# Patient Record
Sex: FEMALE | Race: WHITE | Employment: OTHER | URBAN - METROPOLITAN AREA
[De-identification: names, ages, dates, MRNs, and addresses within clinical notes are randomized per-mention and may not be internally consistent; named-entity substitution may affect disease eponyms.]

---

## 2019-03-15 ENCOUNTER — TELEPHONE (OUTPATIENT)
Dept: ORTHOPEDIC SURGERY | Age: 44
End: 2019-03-15

## 2019-04-03 ENCOUNTER — APPOINTMENT (OUTPATIENT)
Dept: GENERAL RADIOLOGY | Age: 44
End: 2019-04-03
Payer: MEDICARE

## 2019-04-03 ENCOUNTER — HOSPITAL ENCOUNTER (EMERGENCY)
Age: 44
Discharge: HOME | End: 2019-04-03
Attending: EMERGENCY MEDICINE
Payer: MEDICARE

## 2019-04-03 VITALS
BODY MASS INDEX: 39.05 KG/M2 | TEMPERATURE: 98.4 F | OXYGEN SATURATION: 92 % | SYSTOLIC BLOOD PRESSURE: 120 MMHG | WEIGHT: 280 LBS | DIASTOLIC BLOOD PRESSURE: 66 MMHG | HEART RATE: 91 BPM | RESPIRATION RATE: 20 BRPM

## 2019-04-03 DIAGNOSIS — B34.9 VIRAL ILLNESS: Primary | ICD-10-CM

## 2019-04-03 PROCEDURE — 6360000002 HC RX W HCPCS: Performed by: STUDENT IN AN ORGANIZED HEALTH CARE EDUCATION/TRAINING PROGRAM

## 2019-04-03 PROCEDURE — 99283 EMERGENCY DEPT VISIT LOW MDM: CPT

## 2019-04-03 PROCEDURE — 6370000000 HC RX 637 (ALT 250 FOR IP): Performed by: STUDENT IN AN ORGANIZED HEALTH CARE EDUCATION/TRAINING PROGRAM

## 2019-04-03 PROCEDURE — 94640 AIRWAY INHALATION TREATMENT: CPT

## 2019-04-03 RX ORDER — ONDANSETRON 4 MG/1
4 TABLET, FILM COATED ORAL EVERY 8 HOURS PRN
Qty: 10 TABLET | Refills: 0 | Status: SHIPPED | OUTPATIENT
Start: 2019-04-03

## 2019-04-03 RX ORDER — ALBUTEROL SULFATE 90 UG/1
2 AEROSOL, METERED RESPIRATORY (INHALATION)
Status: DISCONTINUED | OUTPATIENT
Start: 2019-04-03 | End: 2019-04-03 | Stop reason: HOSPADM

## 2019-04-03 RX ORDER — ACETAMINOPHEN 325 MG/1
650 TABLET ORAL ONCE
Status: COMPLETED | OUTPATIENT
Start: 2019-04-03 | End: 2019-04-03

## 2019-04-03 RX ORDER — ALBUTEROL SULFATE 2.5 MG/3ML
5 SOLUTION RESPIRATORY (INHALATION)
Status: DISCONTINUED | OUTPATIENT
Start: 2019-04-03 | End: 2019-04-03

## 2019-04-03 RX ORDER — BENZONATATE 100 MG/1
100 CAPSULE ORAL 3 TIMES DAILY PRN
Qty: 30 CAPSULE | Refills: 0 | Status: SHIPPED | OUTPATIENT
Start: 2019-04-03 | End: 2019-04-10

## 2019-04-03 RX ORDER — IBUPROFEN 400 MG/1
400 TABLET ORAL ONCE
Status: COMPLETED | OUTPATIENT
Start: 2019-04-03 | End: 2019-04-03

## 2019-04-03 RX ORDER — ONDANSETRON 4 MG/1
4 TABLET, FILM COATED ORAL ONCE
Status: COMPLETED | OUTPATIENT
Start: 2019-04-03 | End: 2019-04-03

## 2019-04-03 RX ORDER — ALBUTEROL SULFATE 90 UG/1
2 AEROSOL, METERED RESPIRATORY (INHALATION)
Status: DISCONTINUED | OUTPATIENT
Start: 2019-04-03 | End: 2019-04-03

## 2019-04-03 RX ORDER — BENZONATATE 100 MG/1
100 CAPSULE ORAL ONCE
Status: COMPLETED | OUTPATIENT
Start: 2019-04-03 | End: 2019-04-03

## 2019-04-03 RX ADMIN — IBUPROFEN 400 MG: 400 TABLET ORAL at 18:11

## 2019-04-03 RX ADMIN — ONDANSETRON HYDROCHLORIDE 4 MG: 4 TABLET, FILM COATED ORAL at 18:11

## 2019-04-03 RX ADMIN — IPRATROPIUM BROMIDE 0.5 MG: 0.5 SOLUTION RESPIRATORY (INHALATION) at 18:16

## 2019-04-03 RX ADMIN — ACETAMINOPHEN 650 MG: 325 TABLET ORAL at 18:11

## 2019-04-03 RX ADMIN — BENZONATATE 100 MG: 100 CAPSULE ORAL at 18:11

## 2019-04-03 RX ADMIN — ALBUTEROL SULFATE 5 MG: 5 SOLUTION RESPIRATORY (INHALATION) at 18:16

## 2019-04-03 ASSESSMENT — ENCOUNTER SYMPTOMS
ABDOMINAL PAIN: 0
ABDOMINAL DISTENTION: 0
BACK PAIN: 0
COUGH: 1
SORE THROAT: 0
VOMITING: 0
WHEEZING: 0
NAUSEA: 1
SHORTNESS OF BREATH: 0
DIARRHEA: 1
TROUBLE SWALLOWING: 0

## 2019-04-03 ASSESSMENT — PAIN SCALES - GENERAL
PAINLEVEL_OUTOF10: 8
PAINLEVEL_OUTOF10: 8

## 2019-04-03 ASSESSMENT — PAIN DESCRIPTION - DESCRIPTORS: DESCRIPTORS: ACHING

## 2019-04-03 ASSESSMENT — PAIN DESCRIPTION - LOCATION: LOCATION: HEAD

## 2019-04-03 NOTE — ED PROVIDER NOTES
[facility]  Emergency Department Encounter  Emergency Medicine Resident     Pt Name: Lauren Thornton  MRN: [id number]  [place] 1975  Date of evaluation: 4/3/19  PCP:  Meng Rodriguez MD    [address] [place] [address]       Chief Complaint   Patient presents with    Generalized Body Aches    Cough       HISTORY OF PRESENT ILLNESS  (Location/Symptom, Timing/Onset, Context/Setting, Quality, Duration, Modifying Factors, Severity.)    Tiana Davis is a 38 y.o. female who presents with  Generalized body aches, cough, nausea, vomiting. Diarrhea. .  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 Patient states that several people at work , became sick. Patient states that she began having symptoms for days ago. Symptoms as above. Primary complaint this time is cough and diarrhea. Cough has become persistent and is keeping patient from sleeping. Denies any fever, chills, abdominal pain. Denies any chest pain or respiratory distress. Denies any urinary complaints. PAST MEDICAL / SURGICAL / SOCIAL / FAMILY HISTORY    has a past medical history of Arthritis, Depression, and Neuropathic pain. has a past surgical history that includes Arm Surgery (Left);  section; hernia repair; Cholecystectomy; and Tubal ligation.     Social History     Socioeconomic History    Marital status: Legally      Spouse name: Not on file    Number of children: Not on file    Years of education: Not on file    Highest education level: Not on file   Occupational History    Not on file   Social Needs    Financial resource strain: Not on file    Food insecurity:     Worry: Not on file     Inability: Not on file    Transportation needs:     Medical: Not on file     Non-medical: Not on file   Tobacco Use    Smoking status: Current Every Day Smoker     Packs/day: 0.50     Years: 20.00     Pack years: 10.00     Types: Cigarettes    Smokeless tobacco: Never Used   Substance and Sexual Activity    Alcohol use: No    Drug use: No    Sexual activity: Not on file   Lifestyle    Physical activity:     Days per week: Not on file     Minutes per session: Not on file    Stress: Not on file   Relationships    Social connections:     Talks on phone: Not on file     Gets together: Not on file     Attends Gnosticism service: Not on file     Active member of club or organization: Not on file     Attends meetings of clubs or organizations: Not on file     Relationship status: Not on file    Intimate partner violence:     Fear of current or ex partner: Not on file     Emotionally abused: Not on file     Physically abused: Not on file     Forced sexual activity: Not on file   Other Topics Concern    Not on file   Social History Narrative    Not on file       Family History   Problem Relation Age of Onset    High Blood Pressure Mother     Depression Father     Diabetes Father     High Blood Pressure Father     Cancer Sister         breast    Cancer Paternal Aunt         breast    Cancer Paternal Grandmother         breast    Diabetes Paternal Grandmother     Diabetes Paternal Grandfather        Allergies:    Keflex [cephalexin]    Home Medications:  Prior to Admission medications    Medication Sig Start Date End Date Taking?  Authorizing Provider   ondansetron (ZOFRAN) 4 MG tablet Take 1 tablet by mouth every 8 hours as needed for Nausea 4/3/19  Yes Zeina Russell MD   benzonatate (TESSALON PERLES) 100 MG capsule Take 1 capsule by mouth 3 times daily as needed for Cough 4/3/19 4/10/19 Yes Zeina Russell MD   gabapentin (NEURONTIN) 600 MG tablet Take 600 mg by mouth 3 times daily   Yes Historical Provider, MD   venlafaxine (EFFEXOR-XR) 150 MG XR capsule  2/5/16  Yes Historical Provider, MD   tiZANidine (ZANAFLEX) 4 MG tablet Take 2 tablets by mouth nightly 2/26/16  Yes Kayce Saucedo DO   lurasidone (LATUDA) 40 MG TABS tablet Take 80 mg by mouth daily attending note    FINAL IMPRESSION     1. Viral illness        DISPOSITION / PLAN   DISPOSITION Decision To Discharge 04/03/2019 06:32:51 PM      Evaluation and treatment course in the ED, and plan of care upon discharge was discussed in length with the patient. Patient had no further questions prior to being discharged and was instructed to return to the ED for new or worsening symptoms. Any changes to existing medications or new prescriptions were reviewed with patient and they expressed understanding of how to correctly take their medications and the possible side effects.     PATIENT REFERRED TO:  Emily Bernardo MD  [address] [address] [address] [facility]  [phone]    Schedule an appointment as soon as possible for a visit in 3 days        DISCHARGE MEDICATIONS:  Discharge Medication List as of 4/3/2019  6:35 PM      START taking these medications    Details   ondansetron (ZOFRAN) 4 MG tablet Take 1 tablet by mouth every 8 hours as needed for Nausea, Disp-10 tablet, R-0Print      benzonatate (TESSALON PERLES) 100 MG capsule Take 1 capsule by mouth 3 times daily as needed for Cough, Disp-30 capsule, R-0Print             Russel Merino DO  Emergency Medicine Resident Physician, PGY-1    (Please note that portions of this note were completed with a voice recognition program.  Efforts were made to edit the dictations but occasionally words are mis-transcribed.)          Russel Merino MD  04/03/19 [id number]

## 2019-04-03 NOTE — ED NOTES
Pt complain of bodyaches, fever/chills, headache x3 days. Pt states she has been around people that have been ill at work. Pt appears to have mild shortness of breath. Pt is alert and oriented x4, no acute signs of distress noted. Pt friend at bedside.      Sylvia Gunter RN  04/03/19 1437

## 2019-04-03 NOTE — PROGRESS NOTES
2. 23 2.42 2.61 2.81 3.02 3.23 66 - 69 2.04 2.26 2.48 2.71 2.95 3.19 3.44 3.70   70+ 1.82 1.99 2.17 2.36 2.55 2.75 2.95 3.16 70+ 1.97 2.19 2.41 2.64 2.87 3.12 3.37 3.62

## 2019-04-03 NOTE — ED PROVIDER NOTES
[address] [facility]     Emergency Department     Faculty Attestation    I performed a history and physical examination of the patient and discussed management with the resident. I reviewed the residents note and agree with the documented findings and plan of care. Any areas of disagreement are noted on the chart. I was personally present for the key portions of any procedures. I have documented in the chart those procedures where I was not present during the key portions. I have reviewed the emergency nurses triage note. I agree with the chief complaint, past medical history, past surgical history, allergies, medications, social and family history as documented unless otherwise noted below. For Physician Assistant/ Nurse Practitioner cases/documentation I have personally evaluated this patient and have completed at least one if not all key elements of the E/M (history, physical exam, and MDM). Additional findings are as noted. chest clear, heart exam normal, mild erythema posterior pharynx with symmetrical swelling and no signs of peritonsillar abscess, normal voice, no drooling, no sublingual swelling, no cervical lymphadenopathy, no rash or meningeal signs. No pain to palpation of spleen. Neuro intact, no facial swelling. Pt does not appear ill. No lower extremity pain or swelling on examination. Patient has recently been exposed to the flu and has been having a cough since last Sunday.   Sakina Green MD [facility]  Attending Physician          Meng Ramirez MD  04/03/19 2000

## 2020-11-10 ENCOUNTER — HOSPITAL ENCOUNTER (OUTPATIENT)
Age: 45
Setting detail: SPECIMEN
Discharge: HOME | End: 2020-11-10
Payer: COMMERCIAL

## 2020-11-10 ENCOUNTER — OFFICE VISIT (OUTPATIENT)
Dept: PRIMARY CARE CLINIC | Age: 45
End: 2020-11-10
Payer: MEDICARE

## 2020-11-10 VITALS
HEIGHT: 71 IN | HEART RATE: 76 BPM | OXYGEN SATURATION: 96 % | TEMPERATURE: 98.4 F | WEIGHT: 290 LBS | SYSTOLIC BLOOD PRESSURE: 117 MMHG | DIASTOLIC BLOOD PRESSURE: 72 MMHG | BODY MASS INDEX: 40.6 KG/M2

## 2020-11-10 PROCEDURE — 99202 OFFICE O/P NEW SF 15 MIN: CPT | Performed by: INTERNAL MEDICINE

## 2020-11-10 RX ORDER — OMEPRAZOLE 20 MG/1
20 CAPSULE, DELAYED RELEASE ORAL DAILY
COMMUNITY

## 2020-11-10 ASSESSMENT — ENCOUNTER SYMPTOMS
COUGH: 1
FLU SYMPTOMS: 1

## 2020-11-10 NOTE — PROGRESS NOTES
[address] [facility] [address]  Dept: [phone]  Dept Fax: [phone]    Tiana Maynard is a 54 y.o. female who presents to the urgent care today for her medicalconditions/complaints as noted below. Tiana Davis is c/o of Cough; Fever; and Generalized Body Aches      HPI:     Influenza   This is a new problem. The current episode started in the past 7 days. The problem occurs constantly. The problem has been unchanged. Associated symptoms include coughing and myalgias. Nothing aggravates the symptoms. She has tried nothing for the symptoms. The treatment provided no relief. Past Medical History:   Diagnosis Date    Arthritis     Depression     Neuropathic pain         Current Outpatient Medications   Medication Sig Dispense Refill    omeprazole (PRILOSEC) 20 MG delayed release capsule Take 20 mg by mouth daily      gabapentin (NEURONTIN) 600 MG tablet Take 600 mg by mouth 3 times daily      lurasidone (LATUDA) 40 MG TABS tablet Take 80 mg by mouth daily      venlafaxine (EFFEXOR-XR) 150 MG XR capsule   0    tiZANidine (ZANAFLEX) 4 MG tablet Take 2 tablets by mouth nightly 60 tablet 0    ondansetron (ZOFRAN) 4 MG tablet Take 1 tablet by mouth every 8 hours as needed for Nausea (Patient not taking: Reported on 11/10/2020) 10 tablet 0    busPIRone (BUSPAR) 10 MG tablet   0    benztropine (COGENTIN) 1 MG tablet   0    topiramate (TOPAMAX) 50 MG tablet   0     No current facility-administered medications for this visit.       Allergies   Allergen Reactions    Keflex [Cephalexin] Hives and Diarrhea       Health Maintenance   Topic Date Due    Pneumococcal 0-64 years Vaccine (1 of 1 - PPSV23) 01/12/1980    DTaP/Tdap/Td vaccine (1 - Tdap) 01/12/1993    Cervical cancer screen  01/12/1995    Annual Wellness Visit (AWV)  06/23/2019    Lipid screen  04/29/2020    Flu vaccine (1) 09/01/2020    HIV screen  Completed    Hepatitis A vaccine Aged Out    Hepatitis B vaccine  Aged Out    Hib vaccine  Aged Out    Meningococcal (ACWY) vaccine  Aged Out       Subjective:      Review of Systems   Respiratory: Positive for cough. Musculoskeletal: Positive for myalgias. All other systems reviewed and are negative. Objective:     Physical Exam  Vitals signs reviewed. Constitutional:       Appearance: Normal appearance. HENT:      Head: Normocephalic and atraumatic. Skin:     General: Skin is warm and dry. Neurological:      General: No focal deficit present. Mental Status: She is alert and oriented to person, place, and time. Psychiatric:         Mood and Affect: Mood normal.         Behavior: Behavior normal.       /72 (Site: Right Lower Arm, Position: Sitting, Cuff Size: Medium Adult)   Pulse 76   Temp 98.4 °F (36.9 °C)   Ht 5' 11\" (1.803 m)   Wt 290 lb (131.5 kg)   SpO2 96%   BMI 40.45 kg/m²       Assessment:       Diagnosis Orders   1. Flu-like symptoms  COVID-19 Ambulatory       Plan:      No follow-ups on file. No orders of the defined types were placed in this encounter. Orders Placed This Encounter   Procedures    COVID-19 Ambulatory     Standing Status:   Future     Standing Expiration Date:   11/10/2021     Scheduling Instructions:      Saline media preferred given current shortage of viral transport media but both acceptable     Order Specific Question:   Is this test for diagnosis or screening? Answer:   Diagnosis of ill patient     Order Specific Question:   Symptomatic for COVID-19 as defined by CDC? Answer:   Yes     Order Specific Question:   Date of Symptom Onset     Answer:   11/8/2020     Order Specific Question:   Hospitalized for COVID-19? Answer:   No     Order Specific Question:   Admitted to ICU for COVID-19? Answer:   No     Order Specific Question:   Employed in healthcare setting? Answer:   No     Order Specific Question:   Resident in a congregate (group) care setting? Answer:   No     Order Specific Question:   Pregnant? Answer:   No            Patient given educational materials - see patient instructions. Discussed use, benefit, and side effects of prescribed medications. All patientquestions answered. Pt voiced understanding.     Electronically signed by Rebecca Omalley MD on 11/10/2020at 3:36 PM

## 2020-11-15 LAB — SARS-COV-2, NAA: DETECTED

## 2020-11-16 ENCOUNTER — TELEPHONE (OUTPATIENT)
Dept: PRIMARY CARE CLINIC | Age: 45
End: 2020-11-16

## 2020-11-16 ENCOUNTER — TELEPHONE (OUTPATIENT)
Dept: ADMINISTRATIVE | Age: 45
End: 2020-11-16

## 2020-11-16 NOTE — TELEPHONE ENCOUNTER
Patient notified of Positive COVID-19 result. Instructed to quarantine. Expect a call from the local Health department. If any questions to call their PCP.